# Patient Record
Sex: FEMALE | Race: WHITE | Employment: FULL TIME | URBAN - METROPOLITAN AREA
[De-identification: names, ages, dates, MRNs, and addresses within clinical notes are randomized per-mention and may not be internally consistent; named-entity substitution may affect disease eponyms.]

---

## 2017-02-02 ENCOUNTER — TELEPHONE (OUTPATIENT)
Dept: INTERNAL MEDICINE CLINIC | Facility: CLINIC | Age: 47
End: 2017-02-02

## 2017-02-02 DIAGNOSIS — Z13.220 SCREENING FOR LIPOID DISORDERS: ICD-10-CM

## 2017-02-02 DIAGNOSIS — Z13.228 SCREENING FOR METABOLIC DISORDER: ICD-10-CM

## 2017-02-02 DIAGNOSIS — Z13.0 SCREENING FOR DISORDER OF BLOOD AND BLOOD-FORMING ORGANS: ICD-10-CM

## 2017-02-02 DIAGNOSIS — Z13.29 SCREENING FOR THYROID DISORDER: ICD-10-CM

## 2017-02-02 DIAGNOSIS — Z00.00 ROUTINE GENERAL MEDICAL EXAMINATION AT A HEALTH CARE FACILITY: Primary | ICD-10-CM

## 2017-03-12 LAB
ABSOLUTE BASOPHILS: 40 CELLS/UL (ref 0–200)
ABSOLUTE EOSINOPHILS: 92 CELLS/UL (ref 15–500)
ABSOLUTE LYMPHOCYTES: 1564 CELLS/UL (ref 850–3900)
ABSOLUTE MONOCYTES: 587 CELLS/UL (ref 200–950)
ABSOLUTE NEUTROPHILS: 4316 CELLS/UL (ref 1500–7800)
ALBUMIN/GLOBULIN RATIO: 1.5 (CALC) (ref 1–2.5)
ALBUMIN: 4.3 G/DL (ref 3.6–5.1)
ALKALINE PHOSPHATASE: 42 U/L (ref 33–115)
ALT: 11 U/L (ref 6–29)
AST: 16 U/L (ref 10–35)
BASOPHILS: 0.6 %
BILIRUBIN, TOTAL: 0.5 MG/DL (ref 0.2–1.2)
BUN: 17 MG/DL (ref 7–25)
CALCIUM: 9.2 MG/DL (ref 8.6–10.2)
CARBON DIOXIDE: 27 MMOL/L (ref 20–31)
CHLORIDE: 106 MMOL/L (ref 98–110)
CHOL/HDLC RATIO: 2.8 (CALC)
CHOLESTEROL, TOTAL: 214 MG/DL (ref 125–200)
CREATININE: 0.81 MG/DL (ref 0.5–1.1)
EGFR IF AFRICN AM: 100 ML/MIN/1.73M2
EGFR IF NONAFRICN AM: 86 ML/MIN/1.73M2
EOSINOPHILS: 1.4 %
GLOBULIN: 2.8 G/DL (CALC) (ref 1.9–3.7)
GLUCOSE: 90 MG/DL (ref 65–99)
HDL CHOLESTEROL: 77 MG/DL
HEMATOCRIT: 38.5 % (ref 35–45)
HEMOGLOBIN: 13 G/DL (ref 11.7–15.5)
LDL-CHOLESTEROL: 121 MG/DL (CALC)
LYMPHOCYTES: 23.7 %
MCH: 30.3 PG (ref 27–33)
MCHC: 33.8 G/DL (ref 32–36)
MCV: 89.8 FL (ref 80–100)
MONOCYTES: 8.9 %
MPV: 9.3 FL (ref 7.5–12.5)
NEUTROPHILS: 65.4 %
NON-HDL CHOLESTEROL: 137 MG/DL (CALC)
PLATELET COUNT: 173 THOUSAND/UL (ref 140–400)
POTASSIUM: 4.6 MMOL/L (ref 3.5–5.3)
PROTEIN, TOTAL: 7.1 G/DL (ref 6.1–8.1)
RDW: 14.4 % (ref 11–15)
RED BLOOD CELL COUNT: 4.29 MILLION/UL (ref 3.8–5.1)
SODIUM: 139 MMOL/L (ref 135–146)
TRIGLYCERIDES: 82 MG/DL
TSH W/REFLEX TO FT4: 1.09 MIU/L
WHITE BLOOD CELL COUNT: 6.6 THOUSAND/UL (ref 3.8–10.8)

## 2017-03-17 ENCOUNTER — OFFICE VISIT (OUTPATIENT)
Dept: INTERNAL MEDICINE CLINIC | Facility: CLINIC | Age: 47
End: 2017-03-17

## 2017-03-17 VITALS
WEIGHT: 131 LBS | BODY MASS INDEX: 21.05 KG/M2 | TEMPERATURE: 98 F | HEIGHT: 66 IN | SYSTOLIC BLOOD PRESSURE: 114 MMHG | HEART RATE: 72 BPM | DIASTOLIC BLOOD PRESSURE: 62 MMHG | RESPIRATION RATE: 17 BRPM

## 2017-03-17 DIAGNOSIS — E03.9 HYPOTHYROIDISM, UNSPECIFIED TYPE: ICD-10-CM

## 2017-03-17 DIAGNOSIS — Z00.00 ROUTINE GENERAL MEDICAL EXAMINATION AT A HEALTH CARE FACILITY: Primary | ICD-10-CM

## 2017-03-17 PROCEDURE — 99396 PREV VISIT EST AGE 40-64: CPT | Performed by: INTERNAL MEDICINE

## 2017-03-17 RX ORDER — LEVOTHYROXINE SODIUM 0.07 MG/1
75 TABLET ORAL DAILY
Qty: 90 TABLET | Refills: 3 | Status: SHIPPED | OUTPATIENT
Start: 2017-03-17 | End: 2018-05-15

## 2017-03-17 NOTE — PROGRESS NOTES
HPI:    Patient ID: Elizabeth Butt is a 51year old female.     HPI  Here for pe, no acute complaints, hypothyroid on levothyroxine, rest of labs great, exercises (running) and getting back to it with weather improving, sees gyne, up to date, had tdap, sees ASSESSMENT/PLAN:   Routine general medical examination at a health care facility  (primary encounter diagnosis)-had tdap, up to date on gyne, sees derm, labs ok, cont exercise, fu one year  Hypothyroidism, unspecified type-cont levothyroxine,

## 2018-05-15 ENCOUNTER — TELEPHONE (OUTPATIENT)
Dept: INTERNAL MEDICINE CLINIC | Facility: CLINIC | Age: 48
End: 2018-05-15

## 2018-05-15 DIAGNOSIS — Z13.228 SCREENING FOR METABOLIC DISORDER: ICD-10-CM

## 2018-05-15 DIAGNOSIS — Z13.0 SCREENING FOR DISORDER OF BLOOD AND BLOOD-FORMING ORGANS: Primary | ICD-10-CM

## 2018-05-15 DIAGNOSIS — E03.9 HYPOTHYROIDISM, UNSPECIFIED TYPE: ICD-10-CM

## 2018-05-15 DIAGNOSIS — Z13.220 SCREENING FOR LIPID DISORDERS: ICD-10-CM

## 2018-05-15 RX ORDER — LEVOTHYROXINE SODIUM 0.07 MG/1
TABLET ORAL
Qty: 30 TABLET | Refills: 0 | Status: SHIPPED | OUTPATIENT
Start: 2018-05-15 | End: 2018-06-04

## 2018-05-15 NOTE — TELEPHONE ENCOUNTER
LOV: 03/17/2017   Future Visit: No appointments scheduled   Last Labs: 03/11/2017, TSH, Lipid panel, COMP, CBC  Last Rx: 03/17/2017 ( 90 tab and 3 refills)    Per protocol

## 2018-05-15 NOTE — TELEPHONE ENCOUNTER
Spoke to pt and scheduled CPE Future Appointments  Date Time Provider Deysi George   6/4/2018 7:30 AM ADRIAN Siddiqi [facility]            Pt needs fasting blood work orders including TSH sent to [email or web address]

## 2018-05-16 NOTE — TELEPHONE ENCOUNTER
Called patient and LVM that orders have been placed to [facility].  Reminded her that she needs to be fasting.

## 2018-06-04 ENCOUNTER — OFFICE VISIT (OUTPATIENT)
Dept: INTERNAL MEDICINE CLINIC | Facility: CLINIC | Age: 48
End: 2018-06-04

## 2018-06-04 VITALS
HEART RATE: 64 BPM | HEIGHT: 67 IN | BODY MASS INDEX: 20.72 KG/M2 | SYSTOLIC BLOOD PRESSURE: 102 MMHG | TEMPERATURE: 98 F | WEIGHT: 132 LBS | DIASTOLIC BLOOD PRESSURE: 60 MMHG | RESPIRATION RATE: 14 BRPM

## 2018-06-04 DIAGNOSIS — Z00.00 ROUTINE GENERAL MEDICAL EXAMINATION AT A HEALTH CARE FACILITY: Primary | ICD-10-CM

## 2018-06-04 DIAGNOSIS — E03.9 HYPOTHYROIDISM, UNSPECIFIED TYPE: ICD-10-CM

## 2018-06-04 PROCEDURE — 99396 PREV VISIT EST AGE 40-64: CPT | Performed by: NURSE PRACTITIONER

## 2018-06-04 RX ORDER — LEVOTHYROXINE SODIUM 0.07 MG/1
75 TABLET ORAL
Qty: 90 TABLET | Refills: 3 | Status: SHIPPED | OUTPATIENT
Start: 2018-06-04 | End: 2019-06-10

## 2018-06-04 NOTE — PROGRESS NOTES
Martha Amado is a 49year old female who presents for a complete physical exam:       Patient complains of:    Hypothyroid   Levothyroxine 75mcg   Stable TSH     Wt Readings from Last 6 Encounters:  06/04/18 : 132 lb  11/17/17 : 127 lb  03/17/17 : 131 lb Discussed new screening recommendations regarding colon cancer screening age 38.     Pap: gyne      History of dysplasia:    Menstrual Cycle: regular         LMP: 5/21  Symptoms: periods are regular     Mammogram: Jan 2018  Bone Density:  na    Osteoperosis encounter. Meds & Refills for this Visit:  Signed Prescriptions Disp Refills    Levothyroxine Sodium 75 MCG Oral Tab 90 tablet 3      Sig: Take 1 tablet (75 mcg total) by mouth once daily. Imaging & Consults:  None    No Follow-up on file.

## 2018-06-20 DIAGNOSIS — E03.9 HYPOTHYROIDISM, UNSPECIFIED TYPE: ICD-10-CM

## 2018-06-20 RX ORDER — LEVOTHYROXINE SODIUM 0.07 MG/1
TABLET ORAL
Qty: 30 TABLET | Refills: 0 | Status: SHIPPED | OUTPATIENT
Start: 2018-06-20 | End: 2019-01-09

## 2019-01-09 ENCOUNTER — OFFICE VISIT (OUTPATIENT)
Dept: INTERNAL MEDICINE CLINIC | Facility: CLINIC | Age: 49
End: 2019-01-09
Payer: COMMERCIAL

## 2019-01-09 VITALS
DIASTOLIC BLOOD PRESSURE: 72 MMHG | HEIGHT: 67 IN | OXYGEN SATURATION: 98 % | BODY MASS INDEX: 20.66 KG/M2 | SYSTOLIC BLOOD PRESSURE: 112 MMHG | WEIGHT: 131.63 LBS | TEMPERATURE: 99 F | RESPIRATION RATE: 16 BRPM | HEART RATE: 75 BPM

## 2019-01-09 DIAGNOSIS — M72.2 PLANTAR FASCIITIS: Primary | ICD-10-CM

## 2019-01-09 PROCEDURE — 99213 OFFICE O/P EST LOW 20 MIN: CPT | Performed by: INTERNAL MEDICINE

## 2019-01-09 RX ORDER — NAPROXEN 500 MG/1
500 TABLET ORAL 2 TIMES DAILY WITH MEALS
Qty: 20 TABLET | Refills: 0 | Status: SHIPPED | OUTPATIENT
Start: 2019-01-09 | End: 2019-07-01

## 2019-01-09 NOTE — PROGRESS NOTES
HPI:    Patient ID: Anupam Lozada is a 51year old female.     HPI  Her ewith right heel discomfort for 3 months, worse in am and with walking, better with rest  /72 (BP Location: Left arm, Patient Position: Sitting, Cuff Size: adult)   Pulse 75   Te

## 2019-06-10 DIAGNOSIS — E03.9 HYPOTHYROIDISM, UNSPECIFIED TYPE: ICD-10-CM

## 2019-06-10 RX ORDER — LEVOTHYROXINE SODIUM 0.07 MG/1
75 TABLET ORAL
Qty: 90 TABLET | Refills: 3 | Status: SHIPPED | OUTPATIENT
Start: 2019-06-10 | End: 2020-06-08

## 2019-06-10 NOTE — TELEPHONE ENCOUNTER
Protocol failed due to TSH test in past 12 months,TSH value between 0.350 and 5.500 IU/ml    Please advise,    LOV:1/9/19 BRADY  FOV:none on file   LAST RX:6/4/18 75 mcg take 1 tab daily 90 tabs 3 refills   LAST LABS:5/31/18 tsh,lipids,cmp,cbc  PER PROTOCOL:

## 2019-06-12 ENCOUNTER — TELEPHONE (OUTPATIENT)
Dept: INTERNAL MEDICINE CLINIC | Facility: CLINIC | Age: 49
End: 2019-06-12

## 2019-06-12 DIAGNOSIS — Z00.00 ROUTINE GENERAL MEDICAL EXAMINATION AT A HEALTH CARE FACILITY: Primary | ICD-10-CM

## 2019-06-12 DIAGNOSIS — Z13.228 SCREENING FOR METABOLIC DISORDER: ICD-10-CM

## 2019-06-12 DIAGNOSIS — Z13.220 SCREENING FOR LIPID DISORDERS: ICD-10-CM

## 2019-06-12 DIAGNOSIS — Z13.29 SCREENING FOR THYROID DISORDER: ICD-10-CM

## 2019-06-12 DIAGNOSIS — Z13.0 SCREENING FOR DISORDER OF BLOOD AND BLOOD-FORMING ORGANS: ICD-10-CM

## 2019-06-12 NOTE — TELEPHONE ENCOUNTER
Future Appointments   Date Time Provider Deysi Doris   7/1/2019  8:00 AM ROSANNA Epps [facility]   8/2/2019  8:00 AM Henry Sanchez MD [facility]     Patient is scheduled for Annual Physical.  Please place orders w

## 2019-06-12 NOTE — TELEPHONE ENCOUNTER
Future Appointments   Date Time Provider Deysi George   7/1/2019  8:00 AM ROSANNA Lundberg [facility]   8/2/2019  8:00 AM [facility]

## 2019-07-01 ENCOUNTER — OFFICE VISIT (OUTPATIENT)
Dept: INTERNAL MEDICINE CLINIC | Facility: CLINIC | Age: 49
End: 2019-07-01

## 2019-07-01 VITALS
BODY MASS INDEX: 20.72 KG/M2 | WEIGHT: 132 LBS | SYSTOLIC BLOOD PRESSURE: 104 MMHG | DIASTOLIC BLOOD PRESSURE: 60 MMHG | HEART RATE: 60 BPM | TEMPERATURE: 98 F | HEIGHT: 67 IN | RESPIRATION RATE: 14 BRPM

## 2019-07-01 DIAGNOSIS — Z00.00 ROUTINE GENERAL MEDICAL EXAMINATION AT A HEALTH CARE FACILITY: Primary | ICD-10-CM

## 2019-07-01 DIAGNOSIS — Z12.11 SCREEN FOR COLON CANCER: ICD-10-CM

## 2019-07-01 DIAGNOSIS — E03.9 HYPOTHYROIDISM, UNSPECIFIED TYPE: ICD-10-CM

## 2019-07-01 DIAGNOSIS — Z12.31 ENCOUNTER FOR SCREENING MAMMOGRAM FOR MALIGNANT NEOPLASM OF BREAST: ICD-10-CM

## 2019-07-01 PROCEDURE — 99396 PREV VISIT EST AGE 40-64: CPT | Performed by: NURSE PRACTITIONER

## 2019-07-01 NOTE — PROGRESS NOTES
Marcio Jain is a 51year old female who presents for a complete physical exam:       Patient complains of:    Hypothyroid   Levothyroxine 75mcg       Wt Readings from Last 6 Encounters:  07/01/19 : 132 lb  01/09/19 : 131 lb 9.6 oz  06/04/18 : 132 lb  11 Bone Density:  na    Osteoperosis Prevention:    Osteoperosis Prevention was discussed. Reviewed Calcium, Vitamin D supplementation and Weight Bearing Exercises. Patient is performing weight bearing exercises.   Patient is not currently taking calci unspecified type   Levothyroxine 75mcg      No orders of the defined types were placed in this encounter.       Meds & Refills for this Visit:  Requested Prescriptions      No prescriptions requested or ordered in this encounter       Imaging & Consults:  E

## 2019-09-18 PROCEDURE — 87624 HPV HI-RISK TYP POOLED RSLT: CPT | Performed by: OBSTETRICS & GYNECOLOGY

## 2019-09-18 PROCEDURE — 88175 CYTOPATH C/V AUTO FLUID REDO: CPT | Performed by: OBSTETRICS & GYNECOLOGY

## 2019-12-10 PROBLEM — D12.4 BENIGN NEOPLASM OF DESCENDING COLON: Status: ACTIVE | Noted: 2019-12-10

## 2019-12-10 PROBLEM — Z12.11 SPECIAL SCREENING FOR MALIGNANT NEOPLASM OF COLON: Status: ACTIVE | Noted: 2019-12-10

## 2020-06-06 DIAGNOSIS — E03.9 HYPOTHYROIDISM, UNSPECIFIED TYPE: ICD-10-CM

## 2020-06-08 RX ORDER — LEVOTHYROXINE SODIUM 0.07 MG/1
75 TABLET ORAL
Qty: 90 TABLET | Refills: 0 | Status: SHIPPED | OUTPATIENT
Start: 2020-06-08 | End: 2020-09-03

## 2020-06-08 NOTE — TELEPHONE ENCOUNTER
Last OV: 7/1/19 annual PE    Future Appointments   Date Time Provider Deysi Doris   8/7/2020  8:00 AM Vianney Miranda MD [facility]        Latest labs: 6/26/19 CBC, CMP, Lipid and TSH w/ ref    Latest RX: LEVOTHYROXINE 75 MCG TABLET 90

## 2020-09-02 ENCOUNTER — TELEPHONE (OUTPATIENT)
Dept: INTERNAL MEDICINE CLINIC | Facility: CLINIC | Age: 50
End: 2020-09-02

## 2020-09-02 DIAGNOSIS — Z13.220 SCREENING FOR LIPID DISORDERS: ICD-10-CM

## 2020-09-02 DIAGNOSIS — Z13.0 SCREENING FOR DISORDER OF BLOOD AND BLOOD-FORMING ORGANS: ICD-10-CM

## 2020-09-02 DIAGNOSIS — Z00.00 ROUTINE GENERAL MEDICAL EXAMINATION AT A HEALTH CARE FACILITY: Primary | ICD-10-CM

## 2020-09-02 DIAGNOSIS — Z13.29 SCREENING FOR THYROID DISORDER: ICD-10-CM

## 2020-09-02 DIAGNOSIS — Z13.228 SCREENING FOR METABOLIC DISORDER: ICD-10-CM

## 2020-09-02 NOTE — TELEPHONE ENCOUNTER
Future Appointments   Time Provider Department Center    7:40 AM ROSANNA Kidd [facility]     [facility]  Pt informed that labs need to be completed no sooner than 2 weeks prior to the appt.  Pt aware to fast-no call back required

## 2020-09-02 NOTE — TELEPHONE ENCOUNTER
Bloodwork orders placed to [address] [place] [address] lab for upcoming physical per protocol.

## 2020-09-03 DIAGNOSIS — E03.9 HYPOTHYROIDISM, UNSPECIFIED TYPE: ICD-10-CM

## 2020-09-03 RX ORDER — LEVOTHYROXINE SODIUM 0.07 MG/1
TABLET ORAL
Qty: 90 TABLET | Refills: 0 | Status: SHIPPED | OUTPATIENT
Start: 2020-09-03 | End: 2020-09-22

## 2020-09-03 NOTE — TELEPHONE ENCOUNTER
Last Ov: 7/1/19, SD, CPE  Last labs:  TSHw Ref, Lipid, CMP, CBC 9/2/20  Last Rx: levothyroxine 75mcg, #90, 0R 6/8/20    Future Appointments   Date Time Provider Deysi George   9/22/2020  7:40 AM ROSANNA Epps [facility]   2/5/2021  9:30

## 2020-09-18 LAB
ABSOLUTE BASOPHILS: 67 CELLS/UL (ref 0–200)
ABSOLUTE EOSINOPHILS: 60 CELLS/UL (ref 15–500)
ABSOLUTE LYMPHOCYTES: 1240 CELLS/UL (ref 850–3900)
ABSOLUTE MONOCYTES: 670 CELLS/UL (ref 200–950)
ABSOLUTE NEUTROPHILS: 4663 CELLS/UL (ref 1500–7800)
ALBUMIN/GLOBULIN RATIO: 1.5 (CALC) (ref 1–2.5)
ALBUMIN: 4.3 G/DL (ref 3.6–5.1)
ALKALINE PHOSPHATASE: 54 U/L (ref 37–153)
ALT: 21 U/L (ref 6–29)
AST: 23 U/L (ref 10–35)
BASOPHILS: 1 %
BILIRUBIN, TOTAL: 0.7 MG/DL (ref 0.2–1.2)
BUN: 13 MG/DL (ref 7–25)
CALCIUM: 9.3 MG/DL (ref 8.6–10.4)
CARBON DIOXIDE: 27 MMOL/L (ref 20–32)
CHLORIDE: 104 MMOL/L (ref 98–110)
CHOL/HDLC RATIO: 3 (CALC)
CHOLESTEROL, TOTAL: 225 MG/DL
CREATININE: 0.83 MG/DL (ref 0.5–1.05)
EGFR IF AFRICN AM: 95 ML/MIN/1.73M2
EGFR IF NONAFRICN AM: 82 ML/MIN/1.73M2
EOSINOPHILS: 0.9 %
GLOBULIN: 2.8 G/DL (CALC) (ref 1.9–3.7)
GLUCOSE: 85 MG/DL (ref 65–99)
HDL CHOLESTEROL: 75 MG/DL
HEMATOCRIT: 41.2 % (ref 35–45)
HEMOGLOBIN: 13.3 G/DL (ref 11.7–15.5)
LDL-CHOLESTEROL: 132 MG/DL (CALC)
LYMPHOCYTES: 18.5 %
MCH: 29.7 PG (ref 27–33)
MCHC: 32.3 G/DL (ref 32–36)
MCV: 92 FL (ref 80–100)
MONOCYTES: 10 %
MPV: 11.2 FL (ref 7.5–12.5)
NEUTROPHILS: 69.6 %
NON-HDL CHOLESTEROL: 150 MG/DL (CALC)
PLATELET COUNT: 201 THOUSAND/UL (ref 140–400)
POTASSIUM: 4.6 MMOL/L (ref 3.5–5.3)
PROTEIN, TOTAL: 7.1 G/DL (ref 6.1–8.1)
RDW: 12.5 % (ref 11–15)
RED BLOOD CELL COUNT: 4.48 MILLION/UL (ref 3.8–5.1)
SODIUM: 139 MMOL/L (ref 135–146)
TRIGLYCERIDES: 82 MG/DL
TSH W/REFLEX TO FT4: 0.79 MIU/L
WHITE BLOOD CELL COUNT: 6.7 THOUSAND/UL (ref 3.8–10.8)

## 2020-09-22 ENCOUNTER — OFFICE VISIT (OUTPATIENT)
Dept: INTERNAL MEDICINE CLINIC | Facility: CLINIC | Age: 50
End: 2020-09-22
Payer: COMMERCIAL

## 2020-09-22 VITALS
HEIGHT: 67.52 IN | BODY MASS INDEX: 20.09 KG/M2 | TEMPERATURE: 98 F | OXYGEN SATURATION: 99 % | HEART RATE: 66 BPM | SYSTOLIC BLOOD PRESSURE: 112 MMHG | DIASTOLIC BLOOD PRESSURE: 60 MMHG | WEIGHT: 131 LBS | RESPIRATION RATE: 16 BRPM

## 2020-09-22 DIAGNOSIS — E03.9 ACQUIRED HYPOTHYROIDISM: ICD-10-CM

## 2020-09-22 DIAGNOSIS — Z12.31 ENCOUNTER FOR SCREENING MAMMOGRAM FOR MALIGNANT NEOPLASM OF BREAST: ICD-10-CM

## 2020-09-22 DIAGNOSIS — Z00.00 ROUTINE GENERAL MEDICAL EXAMINATION AT A HEALTH CARE FACILITY: Primary | ICD-10-CM

## 2020-09-22 DIAGNOSIS — E03.9 HYPOTHYROIDISM, UNSPECIFIED TYPE: ICD-10-CM

## 2020-09-22 PROBLEM — Z12.11 SPECIAL SCREENING FOR MALIGNANT NEOPLASM OF COLON: Status: RESOLVED | Noted: 2019-12-10 | Resolved: 2020-09-22

## 2020-09-22 PROCEDURE — 3078F DIAST BP <80 MM HG: CPT | Performed by: NURSE PRACTITIONER

## 2020-09-22 PROCEDURE — 99396 PREV VISIT EST AGE 40-64: CPT | Performed by: NURSE PRACTITIONER

## 2020-09-22 PROCEDURE — 3074F SYST BP LT 130 MM HG: CPT | Performed by: NURSE PRACTITIONER

## 2020-09-22 PROCEDURE — 90686 IIV4 VACC NO PRSV 0.5 ML IM: CPT | Performed by: NURSE PRACTITIONER

## 2020-09-22 PROCEDURE — 90471 IMMUNIZATION ADMIN: CPT | Performed by: NURSE PRACTITIONER

## 2020-09-22 PROCEDURE — 3008F BODY MASS INDEX DOCD: CPT | Performed by: NURSE PRACTITIONER

## 2020-09-22 RX ORDER — LEVOTHYROXINE SODIUM 0.07 MG/1
75 TABLET ORAL DAILY
Qty: 90 TABLET | Refills: 3 | Status: SHIPPED | OUTPATIENT
Start: 2020-09-22 | End: 2021-10-25

## 2020-09-22 NOTE — PROGRESS NOTES
Yossi Agudelo is a 47year old female who presents for a complete physical exam:       Patient complains of:    Doing well. No complaints.     Hypothyroid   Levothyroxine 75mcg  Stable     Wt Readings from Last 6 Encounters:  09/22/20 : 131 lb (59.4 kg) 08/03/2020     Bone Density:  na    Osteoperosis Prevention:    Osteoperosis Prevention was discussed. Reviewed Calcium, Vitamin D supplementation and Weight Bearing Exercises. Patient is performing weight bearing exercises.   Patient is currently [place] Consults:  FLULAVAL INFLUENZA VACCINE QUAD PRESERVATIVE FREE 0.5 ML  [facility] ELISA 2D+3D SCREENING BILAT (CPT=77067/01197)    No follow-ups on file. There are no Patient Instructions on file for this visit.

## 2020-10-13 PROCEDURE — 88305 TISSUE EXAM BY PATHOLOGIST: CPT | Performed by: OBSTETRICS & GYNECOLOGY

## 2021-09-29 ENCOUNTER — TELEPHONE (OUTPATIENT)
Dept: INTERNAL MEDICINE CLINIC | Facility: CLINIC | Age: 51
End: 2021-09-29

## 2021-09-29 DIAGNOSIS — Z13.228 SCREENING FOR METABOLIC DISORDER: ICD-10-CM

## 2021-09-29 DIAGNOSIS — Z13.29 SCREENING FOR THYROID DISORDER: ICD-10-CM

## 2021-09-29 DIAGNOSIS — Z00.00 ROUTINE GENERAL MEDICAL EXAMINATION AT A HEALTH CARE FACILITY: Primary | ICD-10-CM

## 2021-09-29 DIAGNOSIS — Z13.220 SCREENING FOR LIPID DISORDERS: ICD-10-CM

## 2021-09-29 DIAGNOSIS — Z13.0 SCREENING FOR DISORDER OF BLOOD AND BLOOD-FORMING ORGANS: ICD-10-CM

## 2021-09-29 NOTE — TELEPHONE ENCOUNTER
Annual Physical   Future Appointments   Date Time Provider Deysi George   10/25/2021  7:20 AM ROSANNA Melo [facility]       [facility] is [facility]  Pt aware to fast and to complete labs no sooner than 2 weeks prior to physical   No call back re

## 2021-10-25 ENCOUNTER — OFFICE VISIT (OUTPATIENT)
Dept: INTERNAL MEDICINE CLINIC | Facility: CLINIC | Age: 51
End: 2021-10-25
Payer: COMMERCIAL

## 2021-10-25 VITALS
WEIGHT: 129 LBS | SYSTOLIC BLOOD PRESSURE: 96 MMHG | HEIGHT: 66 IN | BODY MASS INDEX: 20.73 KG/M2 | TEMPERATURE: 97 F | DIASTOLIC BLOOD PRESSURE: 68 MMHG | HEART RATE: 68 BPM

## 2021-10-25 DIAGNOSIS — Z82.49 FAMILY HISTORY OF CAROTID ARTERY STENOSIS: ICD-10-CM

## 2021-10-25 DIAGNOSIS — E03.9 HYPOTHYROIDISM, UNSPECIFIED TYPE: ICD-10-CM

## 2021-10-25 DIAGNOSIS — E78.5 DYSLIPIDEMIA: ICD-10-CM

## 2021-10-25 DIAGNOSIS — T14.8XXA MUSCLE STRAIN: ICD-10-CM

## 2021-10-25 DIAGNOSIS — Z12.31 ENCOUNTER FOR SCREENING MAMMOGRAM FOR MALIGNANT NEOPLASM OF BREAST: ICD-10-CM

## 2021-10-25 DIAGNOSIS — Z00.00 ROUTINE GENERAL MEDICAL EXAMINATION AT A HEALTH CARE FACILITY: Primary | ICD-10-CM

## 2021-10-25 PROCEDURE — 90471 IMMUNIZATION ADMIN: CPT | Performed by: NURSE PRACTITIONER

## 2021-10-25 PROCEDURE — 3008F BODY MASS INDEX DOCD: CPT | Performed by: NURSE PRACTITIONER

## 2021-10-25 PROCEDURE — 90686 IIV4 VACC NO PRSV 0.5 ML IM: CPT | Performed by: NURSE PRACTITIONER

## 2021-10-25 PROCEDURE — 3078F DIAST BP <80 MM HG: CPT | Performed by: NURSE PRACTITIONER

## 2021-10-25 PROCEDURE — 99396 PREV VISIT EST AGE 40-64: CPT | Performed by: NURSE PRACTITIONER

## 2021-10-25 PROCEDURE — 3074F SYST BP LT 130 MM HG: CPT | Performed by: NURSE PRACTITIONER

## 2021-10-25 RX ORDER — LEVOTHYROXINE SODIUM 0.07 MG/1
75 TABLET ORAL DAILY
Qty: 90 TABLET | Refills: 3 | Status: SHIPPED | OUTPATIENT
Start: 2021-10-25

## 2021-10-25 NOTE — PROGRESS NOTES
Linnette Limon is a 45year old female who presents for a complete physical exam:       Patient complains of:       Hypothyroid TSH 1.0  Levothyroxine 75mcg    Dyslipidemia    Up a bit from her baseline.   Father with carotid artery stenosis and high older 0.5 ml Prefilled syringe (81261)                          09/22/2020  10/25/2021      TDAP                  12/27/2010    Dental Visits: yes   Colon cancer screening:  Colonoscopy due on 12/10/2024   Gyne:   Pap Smear,3 Years due on 09/18/2022 medical examination at a health care facility  (primary encounter diagnosis)  Encounter for screening mammogram for malignant neoplasm of breast  Hypothyroidism, unspecified type  Stable  Levothyroxine    Family history of carotid artery stenosis  Dyslipid

## 2022-01-31 PROBLEM — Z91.89 AT HIGH RISK FOR BREAST CANCER: Status: ACTIVE | Noted: 2022-01-31

## 2022-12-03 DIAGNOSIS — E03.9 HYPOTHYROIDISM, UNSPECIFIED TYPE: ICD-10-CM

## 2022-12-05 ENCOUNTER — TELEPHONE (OUTPATIENT)
Dept: INTERNAL MEDICINE CLINIC | Facility: CLINIC | Age: 52
End: 2022-12-05

## 2022-12-05 DIAGNOSIS — Z13.29 SCREENING FOR ENDOCRINE, NUTRITIONAL, METABOLIC AND IMMUNITY DISORDER: ICD-10-CM

## 2022-12-05 DIAGNOSIS — Z13.0 SCREENING FOR ENDOCRINE, NUTRITIONAL, METABOLIC AND IMMUNITY DISORDER: ICD-10-CM

## 2022-12-05 DIAGNOSIS — E78.5 DYSLIPIDEMIA: ICD-10-CM

## 2022-12-05 DIAGNOSIS — Z13.21 SCREENING FOR ENDOCRINE, NUTRITIONAL, METABOLIC AND IMMUNITY DISORDER: ICD-10-CM

## 2022-12-05 DIAGNOSIS — Z13.228 SCREENING FOR ENDOCRINE, NUTRITIONAL, METABOLIC AND IMMUNITY DISORDER: ICD-10-CM

## 2022-12-05 DIAGNOSIS — Z13.1 SCREENING FOR DIABETES MELLITUS (DM): ICD-10-CM

## 2022-12-05 DIAGNOSIS — E03.9 ACQUIRED HYPOTHYROIDISM: ICD-10-CM

## 2022-12-05 DIAGNOSIS — Z00.00 LABORATORY EXAM ORDERED AS PART OF ROUTINE GENERAL MEDICAL EXAMINATION: Primary | ICD-10-CM

## 2022-12-05 RX ORDER — LEVOTHYROXINE SODIUM 0.07 MG/1
TABLET ORAL
Qty: 90 TABLET | Refills: 0 | Status: SHIPPED | OUTPATIENT
Start: 2022-12-05

## 2022-12-05 NOTE — TELEPHONE ENCOUNTER
CPE   Future Appointments   Date Time Provider Deysi Doris   1/4/2023  7:20 AM ROSANNA Peterson [facility]      Informed must fast no call back required.  Orders to    [facility]

## 2022-12-05 NOTE — TELEPHONE ENCOUNTER
Future Appointments   Date Time Provider Deysi George   1/4/2023  7:20 AM ROSANNA Ramirez [facility]

## 2022-12-30 LAB
ABSOLUTE BASOPHILS: 51 CELLS/UL (ref 0–200)
ABSOLUTE EOSINOPHILS: 128 CELLS/UL (ref 15–500)
ABSOLUTE LYMPHOCYTES: 749 CELLS/UL (ref 850–3900)
ABSOLUTE MONOCYTES: 678 CELLS/UL (ref 200–950)
ABSOLUTE NEUTROPHILS: 4794 CELLS/UL (ref 1500–7800)
ALBUMIN/GLOBULIN RATIO: 1.8 (CALC) (ref 1–2.5)
ALBUMIN: 4.1 G/DL (ref 3.6–5.1)
ALKALINE PHOSPHATASE: 44 U/L (ref 37–153)
ALT: 23 U/L (ref 6–29)
AST: 25 U/L (ref 10–35)
BASOPHILS: 0.8 %
BILIRUBIN, TOTAL: 0.6 MG/DL (ref 0.2–1.2)
BUN: 19 MG/DL (ref 7–25)
CALCIUM: 8.8 MG/DL (ref 8.6–10.4)
CARBON DIOXIDE: 29 MMOL/L (ref 20–32)
CHLORIDE: 106 MMOL/L (ref 98–110)
CHOL/HDLC RATIO: 3.1 (CALC)
CHOLESTEROL, TOTAL: 226 MG/DL
CREATININE: 0.75 MG/DL (ref 0.5–1.03)
EGFR: 95 ML/MIN/1.73M2
EOSINOPHILS: 2 %
GLOBULIN: 2.3 G/DL (CALC) (ref 1.9–3.7)
GLUCOSE: 81 MG/DL (ref 65–99)
HDL CHOLESTEROL: 73 MG/DL
HEMATOCRIT: 37.9 % (ref 35–45)
HEMOGLOBIN: 12.5 G/DL (ref 11.7–15.5)
LDL-CHOLESTEROL: 133 MG/DL (CALC)
LYMPHOCYTES: 11.7 %
MCH: 30.7 PG (ref 27–33)
MCHC: 33 G/DL (ref 32–36)
MCV: 93.1 FL (ref 80–100)
MONOCYTES: 10.6 %
MPV: 10.4 FL (ref 7.5–12.5)
NEUTROPHILS: 74.9 %
NON-HDL CHOLESTEROL: 153 MG/DL (CALC)
PLATELET COUNT: 178 THOUSAND/UL (ref 140–400)
POTASSIUM: 4 MMOL/L (ref 3.5–5.3)
PROTEIN, TOTAL: 6.4 G/DL (ref 6.1–8.1)
RDW: 12.3 % (ref 11–15)
RED BLOOD CELL COUNT: 4.07 MILLION/UL (ref 3.8–5.1)
SODIUM: 140 MMOL/L (ref 135–146)
TRIGLYCERIDES: 94 MG/DL
TSH W/REFLEX TO FT4: 0.48 MIU/L
WHITE BLOOD CELL COUNT: 6.4 THOUSAND/UL (ref 3.8–10.8)

## 2023-01-04 ENCOUNTER — OFFICE VISIT (OUTPATIENT)
Dept: INTERNAL MEDICINE CLINIC | Facility: CLINIC | Age: 53
End: 2023-01-04
Payer: COMMERCIAL

## 2023-01-04 VITALS
HEIGHT: 67 IN | BODY MASS INDEX: 20.56 KG/M2 | WEIGHT: 131 LBS | DIASTOLIC BLOOD PRESSURE: 58 MMHG | HEART RATE: 68 BPM | SYSTOLIC BLOOD PRESSURE: 92 MMHG | TEMPERATURE: 97 F | OXYGEN SATURATION: 99 %

## 2023-01-04 DIAGNOSIS — E03.9 HYPOTHYROIDISM, UNSPECIFIED TYPE: ICD-10-CM

## 2023-01-04 DIAGNOSIS — E03.9 ACQUIRED HYPOTHYROIDISM: ICD-10-CM

## 2023-01-04 DIAGNOSIS — Z82.49 FAMILY HISTORY OF CAROTID ARTERY STENOSIS: ICD-10-CM

## 2023-01-04 DIAGNOSIS — Z12.31 ENCOUNTER FOR SCREENING MAMMOGRAM FOR MALIGNANT NEOPLASM OF BREAST: ICD-10-CM

## 2023-01-04 DIAGNOSIS — Z00.00 ROUTINE GENERAL MEDICAL EXAMINATION AT A HEALTH CARE FACILITY: Primary | ICD-10-CM

## 2023-01-04 DIAGNOSIS — E78.5 DYSLIPIDEMIA: ICD-10-CM

## 2023-01-04 PROCEDURE — 3078F DIAST BP <80 MM HG: CPT | Performed by: NURSE PRACTITIONER

## 2023-01-04 PROCEDURE — 90471 IMMUNIZATION ADMIN: CPT | Performed by: NURSE PRACTITIONER

## 2023-01-04 PROCEDURE — 3074F SYST BP LT 130 MM HG: CPT | Performed by: NURSE PRACTITIONER

## 2023-01-04 PROCEDURE — 3008F BODY MASS INDEX DOCD: CPT | Performed by: NURSE PRACTITIONER

## 2023-01-04 PROCEDURE — 90686 IIV4 VACC NO PRSV 0.5 ML IM: CPT | Performed by: NURSE PRACTITIONER

## 2023-01-04 PROCEDURE — 99396 PREV VISIT EST AGE 40-64: CPT | Performed by: NURSE PRACTITIONER

## 2023-01-04 RX ORDER — LEVOTHYROXINE SODIUM 0.07 MG/1
75 TABLET ORAL DAILY
Qty: 90 TABLET | Refills: 3 | Status: SHIPPED | OUTPATIENT
Start: 2023-01-04

## 2023-01-04 NOTE — PROGRESS NOTES
CPE - Completed 1/4/2023  PAP - UTD Until 9/18/2024  MAMMOGRAM - Due Order Placed 1/31/2022  COLONOSCOPY - UTD Until 12/10/2024      Patient offered Influenza Vaccine ; patient agreed to get Influenza vaccine. Influenza vaccine ordered. 1. Patient is not sick today  2. Patient has never had an allergic reaction to the components of the influenza vaccine  3. Patient has never had a serious reaction to the influenza vaccine in the past  4.  Patient has never had Guillain-[place] Syndrome

## 2024-02-13 ENCOUNTER — TELEPHONE (OUTPATIENT)
Dept: INTERNAL MEDICINE CLINIC | Facility: CLINIC | Age: 54
End: 2024-02-13

## 2024-02-13 DIAGNOSIS — Z13.0 SCREENING FOR DISORDER OF BLOOD AND BLOOD-FORMING ORGANS: ICD-10-CM

## 2024-02-13 DIAGNOSIS — Z13.220 SCREENING FOR LIPID DISORDERS: ICD-10-CM

## 2024-02-13 DIAGNOSIS — Z13.228 SCREENING FOR METABOLIC DISORDER: ICD-10-CM

## 2024-02-13 DIAGNOSIS — Z13.29 SCREENING FOR THYROID DISORDER: ICD-10-CM

## 2024-02-13 DIAGNOSIS — Z00.00 ROUTINE GENERAL MEDICAL EXAMINATION AT A HEALTH CARE FACILITY: Primary | ICD-10-CM

## 2024-02-13 NOTE — TELEPHONE ENCOUNTER
Your appointments       Date & Time Appointment Department (Center)    Mar 06, 2024  4:00 PM CST Adult Physical with Ashanti Hernandez APRN [facility], [address] [place] [address] ([facility] [place])       Informed must fast no call back required. Orders to [facility]

## 2024-02-28 DIAGNOSIS — E03.9 HYPOTHYROIDISM, UNSPECIFIED TYPE: ICD-10-CM

## 2024-02-28 RX ORDER — LEVOTHYROXINE SODIUM 0.07 MG/1
75 TABLET ORAL DAILY
Qty: 90 TABLET | Refills: 0 | Status: SHIPPED | OUTPATIENT
Start: 2024-02-28

## 2024-02-28 NOTE — TELEPHONE ENCOUNTER
Requested Prescriptions     Pending Prescriptions Disp Refills    LEVOTHYROXINE 75 MCG Oral Tab [Pharmacy Med Name: LEVOTHYROXINE 75 MCG TABLET] 90 tablet 1     Sig: TAKE 1 TABLET BY MOUTH EVERY DAY       LOV: 1-4-2023-sd-Physical    LAST CPE: 1-4-2023-sd-Physical    Last Labs: 12--lipid,cbc,cmp,tsh    Last Refill: 1-4-2023-90 tabs with 3 refills     Your appointments       Date & Time Appointment Department (Center)    Mar 06, 2024  4:00 PM CST Adult Physical with Ashanti Hernandez APRN [address] (EMG [address] [place] [address]/[facility])    PLEASE NOTE - Most insurances allow a Complete Physical once every 366 days. Please schedule accordingly.    Please arrive 15 minutes prior to your scheduled appointment. Please also bring your Insurance card, Photo ID, and your medication bottles or a list of your current medication.    If you no longer require this appointment, please contact your physician office to cancel.              [facility], [address] [place] [address]  EM[facility] [address] [place] [address]/[facility]  [address] [place] [id number] [phone]

## 2024-03-05 LAB
ABSOLUTE BASOPHILS: 61 CELLS/UL (ref 0–200)
ABSOLUTE EOSINOPHILS: 102 CELLS/UL (ref 15–500)
ABSOLUTE LYMPHOCYTES: 1137 CELLS/UL (ref 850–3900)
ABSOLUTE MONOCYTES: 500 CELLS/UL (ref 200–950)
ABSOLUTE NEUTROPHILS: 3300 CELLS/UL (ref 1500–7800)
ALBUMIN/GLOBULIN RATIO: 1.5 (CALC) (ref 1–2.5)
ALBUMIN: 4.4 G/DL (ref 3.6–5.1)
ALKALINE PHOSPHATASE: 53 U/L (ref 37–153)
ALT: 15 U/L (ref 6–29)
AST: 22 U/L (ref 10–35)
BASOPHILS: 1.2 %
BILIRUBIN, TOTAL: 0.5 MG/DL (ref 0.2–1.2)
BUN: 20 MG/DL (ref 7–25)
CALCIUM: 9.5 MG/DL (ref 8.6–10.4)
CARBON DIOXIDE: 29 MMOL/L (ref 20–32)
CHLORIDE: 103 MMOL/L (ref 98–110)
CHOL/HDLC RATIO: 3.1 (CALC)
CHOLESTEROL, TOTAL: 252 MG/DL
CREATININE: 0.84 MG/DL (ref 0.5–1.03)
EGFR: 83 ML/MIN/1.73M2
EOSINOPHILS: 2 %
GLOBULIN: 2.9 G/DL (CALC) (ref 1.9–3.7)
GLUCOSE: 82 MG/DL (ref 65–99)
HDL CHOLESTEROL: 81 MG/DL
HEMATOCRIT: 41.5 % (ref 35–45)
HEMOGLOBIN: 13.5 G/DL (ref 11.7–15.5)
LDL-CHOLESTEROL: 152 MG/DL (CALC)
LYMPHOCYTES: 22.3 %
MCH: 30.1 PG (ref 27–33)
MCHC: 32.5 G/DL (ref 32–36)
MCV: 92.6 FL (ref 80–100)
MONOCYTES: 9.8 %
MPV: 10.6 FL (ref 7.5–12.5)
NEUTROPHILS: 64.7 %
NON-HDL CHOLESTEROL: 171 MG/DL (CALC)
PLATELET COUNT: 169 THOUSAND/UL (ref 140–400)
POTASSIUM: 4.3 MMOL/L (ref 3.5–5.3)
PROTEIN, TOTAL: 7.3 G/DL (ref 6.1–8.1)
RDW: 12.3 % (ref 11–15)
RED BLOOD CELL COUNT: 4.48 MILLION/UL (ref 3.8–5.1)
SODIUM: 140 MMOL/L (ref 135–146)
TRIGLYCERIDES: 83 MG/DL
TSH W/REFLEX TO FT4: 1.03 MIU/L
WHITE BLOOD CELL COUNT: 5.1 THOUSAND/UL (ref 3.8–10.8)

## 2024-03-06 ENCOUNTER — OFFICE VISIT (OUTPATIENT)
Dept: INTERNAL MEDICINE CLINIC | Facility: CLINIC | Age: 54
End: 2024-03-06
Payer: COMMERCIAL

## 2024-03-06 VITALS
WEIGHT: 130 LBS | OXYGEN SATURATION: 98 % | SYSTOLIC BLOOD PRESSURE: 102 MMHG | HEIGHT: 67.72 IN | TEMPERATURE: 97 F | BODY MASS INDEX: 19.93 KG/M2 | HEART RATE: 64 BPM | RESPIRATION RATE: 16 BRPM | DIASTOLIC BLOOD PRESSURE: 60 MMHG

## 2024-03-06 DIAGNOSIS — Z12.31 ENCOUNTER FOR SCREENING MAMMOGRAM FOR MALIGNANT NEOPLASM OF BREAST: Primary | ICD-10-CM

## 2024-03-06 DIAGNOSIS — Z00.00 ROUTINE GENERAL MEDICAL EXAMINATION AT A HEALTH CARE FACILITY: ICD-10-CM

## 2024-03-06 DIAGNOSIS — Z91.89 AT HIGH RISK FOR BREAST CANCER: ICD-10-CM

## 2024-03-06 DIAGNOSIS — E03.9 HYPOTHYROIDISM, UNSPECIFIED TYPE: ICD-10-CM

## 2024-03-06 DIAGNOSIS — E03.9 ACQUIRED HYPOTHYROIDISM: ICD-10-CM

## 2024-03-06 DIAGNOSIS — E78.5 DYSLIPIDEMIA: ICD-10-CM

## 2024-03-06 PROCEDURE — 90715 TDAP VACCINE 7 YRS/> IM: CPT | Performed by: NURSE PRACTITIONER

## 2024-03-06 PROCEDURE — 90750 HZV VACC RECOMBINANT IM: CPT | Performed by: NURSE PRACTITIONER

## 2024-03-06 PROCEDURE — 90471 IMMUNIZATION ADMIN: CPT | Performed by: NURSE PRACTITIONER

## 2024-03-06 PROCEDURE — 90472 IMMUNIZATION ADMIN EACH ADD: CPT | Performed by: NURSE PRACTITIONER

## 2024-03-06 PROCEDURE — 99396 PREV VISIT EST AGE 40-64: CPT | Performed by: NURSE PRACTITIONER

## 2024-03-06 RX ORDER — LEVOTHYROXINE SODIUM 0.07 MG/1
75 TABLET ORAL DAILY
Qty: 90 TABLET | Refills: 3 | Status: SHIPPED | OUTPATIENT
Start: 2024-03-06

## 2024-03-06 NOTE — PROGRESS NOTES
Cris Ash is a 53 year old female who presents for a complete physical exam:       Patient complains of:       Dyslipidemia.   from 133   family hx CAD  agrees to RUST.   Has not been running through the winter.  Will restart.      Hypothyroid.  Stable   levothryoxne.     Wt Readings from Last 6 Encounters:   03/06/24 130 lb (59 kg)   01/04/23 131 lb (59.4 kg)   01/31/22 128 lb (58.1 kg)   11/03/21 130 lb (59 kg)   10/25/21 129 lb (58.5 kg)   10/13/20 131 lb (59.4 kg)       CHOLESTEROL, TOTAL (mg/dL)   Date Value   03/04/2024 252 (H)   12/29/2022 226 (H)   10/22/2021 235 (H)     HDL CHOLESTEROL (mg/dL)   Date Value   03/04/2024 81   12/29/2022 73   10/22/2021 82     LDL-CHOLESTEROL (mg/dL (calc))   Date Value   03/04/2024 152 (H)   12/29/2022 133 (H)   10/22/2021 138 (H)     AST (U/L)   Date Value   03/04/2024 22   12/29/2022 25   10/22/2021 17     ALT (U/L)   Date Value   03/04/2024 15   12/29/2022 23   10/22/2021 12        Current Outpatient Medications   Medication Sig Dispense Refill    levothyroxine 75 MCG Oral Tab Take 1 tablet (75 mcg total) by mouth daily. 90 tablet 0      Past Medical History:   Diagnosis Date    HYPOTHYROIDISM     Thyroid disease 2008    Hypo      No past surgical history on file.   Family History   Problem Relation Age of Onset    Diabetes Father     Hypertension Father     High Cholesterol Father     Stroke Father     No Known Problems Sister     Heart Disease Maternal Grandfather         MI 50    Mental Disorder Paternal Grandfather         alzheimers    Breast Cancer Mother 79        unilat ca    No Known Problems Maternal Grandmother     No Known Problems Paternal Grandmother     No Known Problems Half-Sister     Breast Cancer Half-Sister 43        unilat ca    No Known Problems Maternal Aunt            Health Maintenance  Immunizations: tdap and shingrix today.   Shingrix 2 pended.   Immunization History   Administered Date(s) Administered    Covid-19 Vaccine Pfizer 30  mcg/0.3 ml 04/21/2021, 05/12/2021, 12/20/2021    Covid-19 Vaccine Pfizer Bivalent 30mcg/0.3mL 12/28/2022    FLULAVAL 6 months & older 0.5 ml Prefilled syringe (32976) 09/22/2020, 10/25/2021, 01/04/2023    FLUZONE 6 months and older PFS 0.5 ml ([id number]) 09/22/2020, 10/25/2021    TDAP 12/27/2010   Pended Date(s) Pended    TDAP 03/06/2024    Zoster Vaccine Recombinant Adjuvanted (Shingrix) 03/06/2024     Dental Visits: yes   Colon cancer screening: discussed  Dr Corral.  She will call this summer  Health Maintenance   Topic Date Due    Colorectal Cancer Screening  12/10/2024      Gyne:     Health Maintenance   Topic Date Due    Pap Smear  09/18/2024            History of dysplasia:  No    Breast Cancer Screening: follows with high risk breast clinic.     Health Maintenance   Topic Date Due    Mammogram  11/05/2024        Bone Density:  na     Osteoperosis Prevention:    Osteoperosis Prevention was discussed.  Reviewed Calcium, Vitamin D supplementation and Weight Bearing Exercises.    Patient is performing weight bearing exercises.  Patient is currently taking Vitamin D supplementation.        REVIEW OF SYSTEMS:   GENERAL: feels well otherwise  SKIN: denies any unusual skin lesions  EYES:denies blurred vision or double vision  HEENT: denies nasal congestion, sinus pain or ST  LUNGS: denies shortness of breath with exertion  CARDIOVASCULAR: denies chest pain on exertion  GI: denies abdominal pain,denies heartburn  : denies dysuria, vaginal discharge or itching  MUSCULOSKELETAL: denies back pain  NEURO: denies headaches  PSYCH: no c/o      EXAM:   /60 (BP Location: Left arm, Patient Position: Sitting, Cuff Size: adult)   Pulse 64   Temp 97.4 °F (36.3 °C) (Temporal)   Resp 16   Ht 5' 7.72\" (1.72 m)   Wt 130 lb (59 kg)   LMP 12/21/2023 (Exact Date)   SpO2 98%   BMI 19.93 kg/m²   Body mass index is 19.93 kg/m².   GENERAL: well developed, well nourished,in no apparent distress  SKIN: no rashes,no  suspicious lesions  HEENT: atraumatic, normocephalic,ears and throat are clear  EYES:PERRLA, EOMI, conjunctiva are clear  NECK: supple,no adenopathy,  CHEST: no chest tenderness  BREAST: no dominant or suspicious mass  LUNGS: clear to auscultation  CARDIO: RRR without murmur  GI: good BS's,no masses, HSM or tenderness  MUSCULOSKELETAL: back is not tender,  EXTREMITIES: no edema  NEURO: Oriented times three,cranial nerves are intact,motor and sensory are grossly intact    ASSESSMENT AND PLAN:      HEALTH MAINTENANCE:  labs reviewed.  Follows with gyne for well woman.   Shingrix 2 in 2-6 mo.  Order placed.     Encounter Diagnoses   Name Primary?    Encounter for screening mammogram for malignant neoplasm of breast Yes    Routine general medical examination at a health care facility        Orders Placed This Encounter   Procedures    Zoster Recombinant Adjuvanted (Shingrix -Shingles) [80389]    TdaP (Adacel, Boostrix) [42120]       Meds & Refills for this Visit:  Requested Prescriptions      No prescriptions requested or ordered in this encounter       Imaging & Consults:  ZOSTER VACC RECOMBINANT IM NJX  TETANUS, DIPHTHERIA TOXOIDS AND ACELLULAR PERTUSIS VACCINE (TDAP), >7 YEARS, IM USE    No follow-ups on file.  There are no Patient Instructions on file for this visit.

## 2024-03-20 ENCOUNTER — HOSPITAL ENCOUNTER (OUTPATIENT)
Dept: CT IMAGING | Age: 54
Discharge: HOME | End: 2024-03-20
Attending: INTERNAL MEDICINE

## 2024-03-20 DIAGNOSIS — Z13.6 SCREENING FOR HEART DISEASE: ICD-10-CM

## 2024-04-04 ENCOUNTER — TELEMEDICINE (OUTPATIENT)
Dept: INTERNAL MEDICINE CLINIC | Facility: CLINIC | Age: 54
End: 2024-04-04
Payer: COMMERCIAL

## 2024-04-04 DIAGNOSIS — E78.5 DYSLIPIDEMIA: Primary | ICD-10-CM

## 2024-04-04 PROCEDURE — 99213 OFFICE O/P EST LOW 20 MIN: CPT | Performed by: NURSE PRACTITIONER

## 2024-04-04 NOTE — PROGRESS NOTES
This visit is conducted using video and audio.  The patient consents to this service.  The patient understands and accepts financial responsibility for any deductible, co-insurance and/or co-pays associated with this service.    Time Spent: 15min      Cris Ash is a 53 year old female.  No chief complaint on file.      HPI:   Video visit for lab result discussion  Here for CPE in early March  labs not done at ov.   Dyslipidemia.  Chol total 252 with     this is up from 226 total and   dad with carotid disease Hx CEA.  She is a runner and took the winter off  she is not over weight  UFHS completed.  Over read unremarkable calcium score zero.  Discussed with patient  risk benefit of statin  prefers to start her spring running again and check lipid in 3 mo.  If still elevated she is not opposed to statin.       Patient Active Problem List   Diagnosis    Hypothyroidism    Family history of breast cancer    Enthesopathy of unspecified site    Benign neoplasm of descending colon    Family history of carotid artery stenosis    Dyslipidemia    At high risk for breast cancer     Current Outpatient Medications   Medication Sig Dispense Refill    levothyroxine 75 MCG Oral Tab Take 1 tablet (75 mcg total) by mouth daily. 90 tablet 3              Allergies  No Known Allergies    REVIEW OF SYSTEMS:   GENERAL HEALTH: feels well otherwise  NEURO: denies headaches,       EXAM:   Limited exam as this is a video visit.  Appropriate affect, alert and oriented x 3.  No distress.  No conversational dyspnea.  Speaking in full sentences.        ASSESSMENT AND PLAN:     Encounter Diagnosis   Name Primary?    Dyslipidemia  restart her running exercise program.  Lipid 3 mo.  If continues to be elevated consider statin.  She is not opposed.   Yes       Orders Placed This Encounter   Procedures    Lipid Panel       Meds & Refills for this Visit:  Requested Prescriptions      No prescriptions requested or ordered in this  encounter       Imaging & Consults:  None      Please note that the above visit was completed using two-way, real-time interactive audio and video communication.  There are limitations of this visit as no physical exam could be performed.  Every conscious effort was taken to allow for sufficient and adequate time.  This billing visit was spent on reviewing labs, medications, radiology tests and decision making.  Appropriate medical decision-making and tests are ordered as detailed in the plan of care below.    Cris Ash understands phone evaluation is not a substitute for face-to-face examination or emergency care. Patient advised to go to ER or call 911 for worsening symptoms or acute distress.

## 2024-10-26 LAB
CHOL/HDLC RATIO: 3.1 (CALC)
CHOLESTEROL, TOTAL: 235 MG/DL
HDL CHOLESTEROL: 76 MG/DL
LDL-CHOLESTEROL: 140 MG/DL (CALC)
NON-HDL CHOLESTEROL: 159 MG/DL (CALC)
TRIGLYCERIDES: 88 MG/DL

## 2024-12-19 PROBLEM — Z86.0101 PERSONAL HISTORY OF ADENOMATOUS AND SERRATED COLON POLYPS: Status: ACTIVE | Noted: 2024-12-19

## 2024-12-19 PROBLEM — D12.5 BENIGN NEOPLASM OF SIGMOID COLON: Status: ACTIVE | Noted: 2024-12-19

## 2025-06-05 ENCOUNTER — TELEPHONE (OUTPATIENT)
Dept: INTERNAL MEDICINE CLINIC | Facility: CLINIC | Age: 55
End: 2025-06-05

## 2025-06-05 NOTE — TELEPHONE ENCOUNTER
Patient called request labs prior to their annual physical.  Annual physical scheduled for 7/7/25   Please order labs. Patient preferred lab is [facility]  Patient informed request was sent to clinical team.  Patient informed to fast for labs.  No callback required.   Future Appointments   Date Time Provider Department Center   7/7/2025  5:00 PM Ashanti Hernandez APRN [facility]

## 2025-07-07 ENCOUNTER — OFFICE VISIT (OUTPATIENT)
Dept: INTERNAL MEDICINE CLINIC | Facility: CLINIC | Age: 55
End: 2025-07-07
Payer: COMMERCIAL

## 2025-07-07 VITALS
TEMPERATURE: 98 F | RESPIRATION RATE: 16 BRPM | HEART RATE: 58 BPM | OXYGEN SATURATION: 98 % | DIASTOLIC BLOOD PRESSURE: 66 MMHG | BODY MASS INDEX: 22.02 KG/M2 | WEIGHT: 137 LBS | HEIGHT: 66 IN | SYSTOLIC BLOOD PRESSURE: 98 MMHG

## 2025-07-07 DIAGNOSIS — E03.9 ACQUIRED HYPOTHYROIDISM: ICD-10-CM

## 2025-07-07 DIAGNOSIS — E78.5 DYSLIPIDEMIA: ICD-10-CM

## 2025-07-07 DIAGNOSIS — Z00.00 ROUTINE GENERAL MEDICAL EXAMINATION AT A HEALTH CARE FACILITY: Primary | ICD-10-CM

## 2025-07-07 DIAGNOSIS — Z82.49 FAMILY HISTORY OF CAROTID ARTERY STENOSIS: ICD-10-CM

## 2025-07-07 RX ORDER — LEVOTHYROXINE SODIUM 75 UG/1
75 TABLET ORAL DAILY
Qty: 90 TABLET | Refills: 3 | Status: SHIPPED | OUTPATIENT
Start: 2025-07-07

## 2025-07-07 NOTE — PROGRESS NOTES
The following individual(s) verbally consented to be recorded using ambient AI listening technology and understand that they can each withdraw their consent to this listening technology at any point by asking the clinician to turn off or pause the recording:    Patient name: Cris Ash  Additional names:            Cris Ash is a 54 year old female who presents for a complete physical exam:       Patient complains of:     History of Present Illness  Cris Ash is a 54 year old female with hereditary high cholesterol who presents for a follow-up on her cholesterol management.    She experiences increased cholesterol levels despite increased physical activity, particularly in the winter months. She is concerned about managing her cholesterol due to its hereditary nature. She is currently taking levothyroxine 75 micrograms daily for a thyroid condition. Her family history is significant for high cholesterol. There is no swelling in her ankles and no other complaints.  T 253 with   UFHS score zero 3/2024  Wt Readings from Last 6 Encounters:   07/07/25 137 lb (62.1 kg)   11/22/24 130 lb (59 kg)   03/06/24 130 lb (59 kg)   01/04/23 131 lb (59.4 kg)   01/31/22 128 lb (58.1 kg)   11/03/21 130 lb (59 kg)       Results  RADIOLOGY  Ultra fast heart scan: Score 0  MRI: All clear    DIAGNOSTIC  Colonoscopy: Normal findings      Current Medications[1]   Past Medical History[2]   Past Surgical History[3]   Family History[4]        Health Maintenance  Immunizations:   Immunization History   Administered Date(s) Administered    Covid-19 Vaccine Pfizer 30 mcg/0.3 ml 04/21/2021, 05/12/2021, 12/20/2021    Covid-19 Vaccine Pfizer Bivalent 30mcg/0.3mL 12/28/2022    FLULAVAL 6 months & older 0.5 ml Prefilled syringe ([id number]) 09/22/2020, 10/25/2021, 01/04/2023    FLUZONE 6 months and older PFS 0.5 ml (95946) 09/22/2020, 10/25/2021    Pneumococcal Conjugate PCV20 03/08/2025    TDAP 12/27/2010, 03/06/2024, 03/08/2025     Zoster Vaccine Recombinant Adjuvanted (Shingrix) 03/06/2024   Pended Date(s) Pended    Zoster Vaccine Recombinant Adjuvanted (Shingrix) 03/06/2024     Dental Visits: yes   Colon cancer screening:    Health Maintenance   Topic Date Due    Colorectal Cancer Screening  12/19/2029      Gyne:     Health Maintenance   Topic Date Due    Pap Smear  11/10/2027            History of dysplasia:  No  Breast Cancer Screening: MRI and mammogram completed.     Health Maintenance   Topic Date Due    Mammogram  11/19/2025        Bone Density:  na     Osteoporosis Prevention:    Osteoporosis Prevention was discussed.  Reviewed Calcium, Vitamin D supplementation and Weight Bearing Exercises.    Patient is performing weight bearing exercises.  Patient is currently taking Vitamin D supplementation.        REVIEW OF SYSTEMS:   GENERAL: feels well otherwise  SKIN: denies any unusual skin lesions  EYES:denies blurred vision or double vision  HEENT: denies nasal congestion, sinus pain or ST  LUNGS: denies shortness of breath with exertion  CARDIOVASCULAR: denies chest pain on exertion  GI: denies abdominal pain,denies heartburn  : denies dysuria, vaginal discharge or itching  MUSCULOSKELETAL: denies back pain  NEURO: denies headaches  PSYCH: no c/o      EXAM:   BP 98/66 (BP Location: Left arm, Patient Position: Sitting, Cuff Size: adult)   Pulse 58   Temp 98.4 °F (36.9 °C) (Temporal)   Resp 16   Ht 5' 6\" (1.676 m)   Wt 137 lb (62.1 kg)   SpO2 98%   BMI 22.11 kg/m²   Body mass index is 22.11 kg/m².   GENERAL: well developed, well nourished,in no apparent distress  SKIN: no rashes,no suspicious lesions  HEENT: atraumatic, normocephalic,ears and throat are clear  EYES:PERRLA, EOMI, conjunctiva are clear  NECK: supple,no adenopathy,  CHEST: no chest tenderness  BREAST: no dominant or suspicious mass  LUNGS: clear to auscultation  CARDIO: RRR without murmur  GI: good BS's,no masses, HSM or tenderness  MUSCULOSKELETAL: back is not  tender,  EXTREMITIES: no edema  NEURO: Oriented times three,cranial nerves are intact,motor and sensory are grossly intact    ASSESSMENT AND PLAN:      HEALTH MAINTENANCE:  she is due for shingrix #2  she will schedule nurse visit as she has upcoming plans this week.   Well woman care with gyne  as well as high risk breast clinic.  MRI completed    Assessment & Plan  Hypercholesterolemia  Hereditary hypercholesterolemia with borderline cholesterol levels despite lifestyle changes. Statins discussed for cholesterol reduction and plaque stabilization.  - Provide information on peripheral vascular screening for baseline assessment. Dad with hx CEA  - Consider statin therapy if carotid plaque is detected.    General Health Maintenance  Current with screenings and vaccinations except for uncertainty about shingles vaccine completion.  - Schedule nurse visit for second shingles vaccine.      Encounter Diagnoses   Name Primary?    Routine general medical examination at a health care facility Yes    Dyslipidemia     Family history of carotid artery stenosis     Acquired hypothyroidism         No orders of the defined types were placed in this encounter.      Meds & Refills for this Visit:  Requested Prescriptions     Signed Prescriptions Disp Refills    levothyroxine 75 MCG Oral Tab 90 tablet 3     Sig: Take 1 tablet (75 mcg total) by mouth daily.       Imaging & Consults:  None    Return in about 1 year (around 7/7/2026).  There are no Patient Instructions on file for this visit.             [1]   Current Outpatient Medications   Medication Sig Dispense Refill    levothyroxine 75 MCG Oral Tab Take 1 tablet (75 mcg total) by mouth daily. 90 tablet 3   [2]   Past Medical History:   High cholesterol    Last screen was 230    HYPOTHYROIDISM    Thyroid disease    Hypo   [3]   Past Surgical History:  Procedure Laterality Date    Colonoscopy     [4]   Family History  Problem Relation Age of Onset    Diabetes Father      Hypertension Father     High Cholesterol Father     Stroke Father     Breast Cancer Sister     Heart Disease Maternal Grandfather         MI 50    Mental Disorder Paternal Grandfather         alzheimers    Breast Cancer Mother 79        unilat ca    No Known Problems Maternal Grandmother     No Known Problems Paternal Grandmother     No Known Problems Half-Sister     Breast Cancer Half-Sister 43        unilat ca    No Known Problems Maternal Aunt

## 2025-08-27 ENCOUNTER — PATIENT MESSAGE (OUTPATIENT)
Dept: INTERNAL MEDICINE CLINIC | Facility: CLINIC | Age: 55
End: 2025-08-27

## 2025-08-29 RX ORDER — LEVOTHYROXINE SODIUM 75 UG/1
75 TABLET ORAL DAILY
Qty: 90 TABLET | Refills: 3 | Status: SHIPPED | OUTPATIENT
Start: 2025-08-29

## (undated) NOTE — LETTER
07/31/19        [address] 53      Dear Jared Grullon,    Our records indicate that you have outstanding lab work and or testing that was ordered for you and has not yet been completed:  Orders Placed This Encounter

## (undated) NOTE — MR AVS SNAPSHOT
[facility] [address]  [phone]               Thank you for choosing us for your health care visit with Wesley Osler, MD.  We are glad to serve you and happy to provide you with this summ If you've recently had a stay at the Hospital you can access your discharge instructions in [facility] by going to Visits < Admission Summaries.  If you've been to the Emergency Department or your doctor's office, you can view your past visit information in My